# Patient Record
Sex: FEMALE | Race: WHITE | NOT HISPANIC OR LATINO | URBAN - METROPOLITAN AREA
[De-identification: names, ages, dates, MRNs, and addresses within clinical notes are randomized per-mention and may not be internally consistent; named-entity substitution may affect disease eponyms.]

---

## 2023-09-24 LAB
ANION GAP IN SER/PLAS: 11 MMOL/L (ref 10–20)
CALCIUM (MG/DL) IN SER/PLAS: 9.6 MG/DL (ref 8.6–10.6)
CARBON DIOXIDE, TOTAL (MMOL/L) IN SER/PLAS: 29 MMOL/L (ref 21–32)
CHLORIDE (MMOL/L) IN SER/PLAS: 106 MMOL/L (ref 98–107)
CREATININE (MG/DL) IN SER/PLAS: 0.8 MG/DL (ref 0.5–1.05)
ERYTHROCYTE DISTRIBUTION WIDTH (RATIO) BY AUTOMATED COUNT: 12.3 % (ref 11.5–14.5)
ERYTHROCYTE MEAN CORPUSCULAR HEMOGLOBIN CONCENTRATION (G/DL) BY AUTOMATED: 31.8 G/DL (ref 32–36)
ERYTHROCYTE MEAN CORPUSCULAR VOLUME (FL) BY AUTOMATED COUNT: 99 FL (ref 80–100)
ERYTHROCYTES (10*6/UL) IN BLOOD BY AUTOMATED COUNT: 4.18 X10E12/L (ref 4–5.2)
GFR FEMALE: 83 ML/MIN/1.73M2
GLUCOSE (MG/DL) IN SER/PLAS: 83 MG/DL (ref 74–99)
HEMATOCRIT (%) IN BLOOD BY AUTOMATED COUNT: 41.5 % (ref 36–46)
HEMOGLOBIN (G/DL) IN BLOOD: 13.2 G/DL (ref 12–16)
LEUKOCYTES (10*3/UL) IN BLOOD BY AUTOMATED COUNT: 4.9 X10E9/L (ref 4.4–11.3)
NRBC (PER 100 WBCS) BY AUTOMATED COUNT: 0 /100 WBC (ref 0–0)
PLATELETS (10*3/UL) IN BLOOD AUTOMATED COUNT: 210 X10E9/L (ref 150–450)
POTASSIUM (MMOL/L) IN SER/PLAS: 5.1 MMOL/L (ref 3.5–5.3)
SODIUM (MMOL/L) IN SER/PLAS: 141 MMOL/L (ref 136–145)
UREA NITROGEN (MG/DL) IN SER/PLAS: 15 MG/DL (ref 6–23)

## 2023-10-13 PROBLEM — Q21.12 PFO (PATENT FORAMEN OVALE) (HHS-HCC): Status: ACTIVE | Noted: 2023-10-13

## 2023-10-13 RX ORDER — SODIUM CHLORIDE 9 MG/ML
100 INJECTION, SOLUTION INTRAVENOUS CONTINUOUS
Status: CANCELLED | OUTPATIENT
Start: 2023-10-13

## 2023-10-17 ENCOUNTER — APPOINTMENT (OUTPATIENT)
Dept: CARDIOLOGY | Facility: HOSPITAL | Age: 63
End: 2023-10-17
Payer: COMMERCIAL

## 2023-10-17 ENCOUNTER — HOSPITAL ENCOUNTER (OUTPATIENT)
Facility: HOSPITAL | Age: 63
Setting detail: OUTPATIENT SURGERY
Discharge: HOME | End: 2023-10-17
Attending: INTERNAL MEDICINE | Admitting: INTERNAL MEDICINE
Payer: COMMERCIAL

## 2023-10-17 VITALS
BODY MASS INDEX: 18.65 KG/M2 | DIASTOLIC BLOOD PRESSURE: 50 MMHG | RESPIRATION RATE: 15 BRPM | HEIGHT: 60 IN | OXYGEN SATURATION: 100 % | SYSTOLIC BLOOD PRESSURE: 117 MMHG | HEART RATE: 56 BPM | WEIGHT: 95 LBS

## 2023-10-17 DIAGNOSIS — Q21.12 PFO (PATENT FORAMEN OVALE) (HHS-HCC): ICD-10-CM

## 2023-10-17 DIAGNOSIS — Q21.10 ATRIAL SEPTAL DEFECT (HHS-HCC): ICD-10-CM

## 2023-10-17 DIAGNOSIS — Z87.74 S/P PATENT FORAMEN OVALE CLOSURE: Primary | ICD-10-CM

## 2023-10-17 PROCEDURE — C1759 CATH, INTRA ECHOCARDIOGRAPHY: HCPCS | Performed by: INTERNAL MEDICINE

## 2023-10-17 PROCEDURE — 99152 MOD SED SAME PHYS/QHP 5/>YRS: CPT | Performed by: INTERNAL MEDICINE

## 2023-10-17 PROCEDURE — 93580 TRANSCATH CLOSURE OF ASD: CPT | Performed by: INTERNAL MEDICINE

## 2023-10-17 PROCEDURE — 2500000001 HC RX 250 WO HCPCS SELF ADMINISTERED DRUGS (ALT 637 FOR MEDICARE OP): Performed by: NURSE PRACTITIONER

## 2023-10-17 PROCEDURE — C1817 SEPTAL DEFECT IMP SYS: HCPCS | Performed by: INTERNAL MEDICINE

## 2023-10-17 PROCEDURE — 85347 COAGULATION TIME ACTIVATED: CPT | Performed by: INTERNAL MEDICINE

## 2023-10-17 PROCEDURE — 93662 INTRACARDIAC ECG (ICE): CPT | Performed by: INTERNAL MEDICINE

## 2023-10-17 PROCEDURE — C1769 GUIDE WIRE: HCPCS | Performed by: INTERNAL MEDICINE

## 2023-10-17 PROCEDURE — 99153 MOD SED SAME PHYS/QHP EA: CPT | Performed by: INTERNAL MEDICINE

## 2023-10-17 PROCEDURE — C1894 INTRO/SHEATH, NON-LASER: HCPCS | Performed by: INTERNAL MEDICINE

## 2023-10-17 PROCEDURE — 93010 ELECTROCARDIOGRAM REPORT: CPT | Performed by: INTERNAL MEDICINE

## 2023-10-17 PROCEDURE — 2780000003 HC OR 278 NO HCPCS: Performed by: INTERNAL MEDICINE

## 2023-10-17 PROCEDURE — 93308 TTE F-UP OR LMTD: CPT

## 2023-10-17 PROCEDURE — 2500000004 HC RX 250 GENERAL PHARMACY W/ HCPCS (ALT 636 FOR OP/ED): Performed by: NURSE PRACTITIONER

## 2023-10-17 PROCEDURE — 93308 TTE F-UP OR LMTD: CPT | Performed by: INTERNAL MEDICINE

## 2023-10-17 PROCEDURE — 2500000005 HC RX 250 GENERAL PHARMACY W/O HCPCS: Performed by: INTERNAL MEDICINE

## 2023-10-17 PROCEDURE — 93325 DOPPLER ECHO COLOR FLOW MAPG: CPT | Performed by: INTERNAL MEDICINE

## 2023-10-17 PROCEDURE — 2500000004 HC RX 250 GENERAL PHARMACY W/ HCPCS (ALT 636 FOR OP/ED): Performed by: INTERNAL MEDICINE

## 2023-10-17 PROCEDURE — 2720000007 HC OR 272 NO HCPCS: Performed by: INTERNAL MEDICINE

## 2023-10-17 PROCEDURE — 85347 COAGULATION TIME ACTIVATED: CPT | Mod: CMCLAB

## 2023-10-17 PROCEDURE — 2500000001 HC RX 250 WO HCPCS SELF ADMINISTERED DRUGS (ALT 637 FOR MEDICARE OP): Performed by: INTERNAL MEDICINE

## 2023-10-17 PROCEDURE — C1760 CLOSURE DEV, VASC: HCPCS | Performed by: INTERNAL MEDICINE

## 2023-10-17 DEVICE — PFO OCCLUDER
Type: IMPLANTABLE DEVICE | Site: HEART | Status: FUNCTIONAL
Brand: AMPLATZER™ TALISMAN™

## 2023-10-17 RX ORDER — LIDOCAINE HYDROCHLORIDE 20 MG/ML
INJECTION, SOLUTION INFILTRATION; PERINEURAL AS NEEDED
Status: DISCONTINUED | OUTPATIENT
Start: 2023-10-17 | End: 2023-10-17 | Stop reason: HOSPADM

## 2023-10-17 RX ORDER — ACETAMINOPHEN 160 MG/5ML
650 SOLUTION ORAL EVERY 6 HOURS PRN
Status: CANCELLED | OUTPATIENT
Start: 2023-10-17

## 2023-10-17 RX ORDER — PANTOPRAZOLE SODIUM 40 MG/10ML
40 INJECTION, POWDER, LYOPHILIZED, FOR SOLUTION INTRAVENOUS
Status: CANCELLED | OUTPATIENT
Start: 2023-10-18

## 2023-10-17 RX ORDER — DOCUSATE SODIUM 100 MG/1
100 CAPSULE, LIQUID FILLED ORAL 2 TIMES DAILY
Status: CANCELLED | OUTPATIENT
Start: 2023-10-17

## 2023-10-17 RX ORDER — ATORVASTATIN CALCIUM 80 MG/1
80 TABLET, FILM COATED ORAL DAILY
COMMUNITY
Start: 2023-07-07

## 2023-10-17 RX ORDER — ACETAMINOPHEN 325 MG/1
650 TABLET ORAL EVERY 6 HOURS PRN
Status: CANCELLED | OUTPATIENT
Start: 2023-10-17

## 2023-10-17 RX ORDER — PANTOPRAZOLE SODIUM 40 MG/1
40 TABLET, DELAYED RELEASE ORAL
Status: CANCELLED | OUTPATIENT
Start: 2023-10-18

## 2023-10-17 RX ORDER — CLOPIDOGREL BISULFATE 75 MG/1
75 TABLET ORAL DAILY
Qty: 90 TABLET | Refills: 2 | Status: SHIPPED | OUTPATIENT
Start: 2023-10-17

## 2023-10-17 RX ORDER — ACETAMINOPHEN 650 MG/1
650 SUPPOSITORY RECTAL EVERY 6 HOURS PRN
Status: CANCELLED | OUTPATIENT
Start: 2023-10-17

## 2023-10-17 RX ORDER — NAPROXEN SODIUM 220 MG/1
81 TABLET, FILM COATED ORAL DAILY
Qty: 90 TABLET | Refills: 11 | Status: SHIPPED | OUTPATIENT
Start: 2023-10-17

## 2023-10-17 RX ORDER — TRAMADOL HYDROCHLORIDE 50 MG/1
50 TABLET ORAL EVERY 6 HOURS PRN
Status: CANCELLED | OUTPATIENT
Start: 2023-10-17

## 2023-10-17 RX ORDER — ONDANSETRON HYDROCHLORIDE 2 MG/ML
4 INJECTION, SOLUTION INTRAVENOUS EVERY 8 HOURS PRN
Status: CANCELLED | OUTPATIENT
Start: 2023-10-17

## 2023-10-17 RX ORDER — MIDAZOLAM HYDROCHLORIDE 1 MG/ML
INJECTION INTRAMUSCULAR; INTRAVENOUS AS NEEDED
Status: DISCONTINUED | OUTPATIENT
Start: 2023-10-17 | End: 2023-10-17 | Stop reason: HOSPADM

## 2023-10-17 RX ORDER — HEPARIN SODIUM 1000 [USP'U]/ML
INJECTION, SOLUTION INTRAVENOUS; SUBCUTANEOUS AS NEEDED
Status: DISCONTINUED | OUTPATIENT
Start: 2023-10-17 | End: 2023-10-17 | Stop reason: HOSPADM

## 2023-10-17 RX ORDER — CLOPIDOGREL BISULFATE 75 MG/1
75 TABLET ORAL DAILY
Status: DISCONTINUED | OUTPATIENT
Start: 2023-10-18 | End: 2023-10-17 | Stop reason: HOSPADM

## 2023-10-17 RX ORDER — SODIUM CHLORIDE, SODIUM LACTATE, POTASSIUM CHLORIDE, CALCIUM CHLORIDE 600; 310; 30; 20 MG/100ML; MG/100ML; MG/100ML; MG/100ML
75 INJECTION, SOLUTION INTRAVENOUS CONTINUOUS
Status: DISCONTINUED | OUTPATIENT
Start: 2023-10-17 | End: 2023-10-17 | Stop reason: HOSPADM

## 2023-10-17 RX ORDER — LEVOTHYROXINE SODIUM 75 UG/1
75 TABLET ORAL
COMMUNITY
Start: 2013-12-03

## 2023-10-17 RX ORDER — VANCOMYCIN HYDROCHLORIDE 1 G/200ML
1000 INJECTION, SOLUTION INTRAVENOUS ONCE
Status: COMPLETED | OUTPATIENT
Start: 2023-10-17 | End: 2023-10-17

## 2023-10-17 RX ORDER — ESTRADIOL 10 UG/1
10 INSERT VAGINAL 2 TIMES WEEKLY
COMMUNITY
Start: 2017-12-08

## 2023-10-17 RX ORDER — CLOPIDOGREL BISULFATE 300 MG/1
TABLET, FILM COATED ORAL AS NEEDED
Status: DISCONTINUED | OUTPATIENT
Start: 2023-10-17 | End: 2023-10-17 | Stop reason: HOSPADM

## 2023-10-17 RX ORDER — ASPIRIN 81 MG/1
81 TABLET ORAL DAILY
Status: CANCELLED | OUTPATIENT
Start: 2023-10-18

## 2023-10-17 RX ORDER — FENTANYL CITRATE 50 UG/ML
INJECTION, SOLUTION INTRAMUSCULAR; INTRAVENOUS AS NEEDED
Status: DISCONTINUED | OUTPATIENT
Start: 2023-10-17 | End: 2023-10-17 | Stop reason: HOSPADM

## 2023-10-17 RX ORDER — NAPROXEN SODIUM 220 MG/1
324 TABLET, FILM COATED ORAL ONCE
Status: COMPLETED | OUTPATIENT
Start: 2023-10-17 | End: 2023-10-17

## 2023-10-17 RX ORDER — ONDANSETRON 4 MG/1
4 TABLET, ORALLY DISINTEGRATING ORAL EVERY 8 HOURS PRN
Status: CANCELLED | OUTPATIENT
Start: 2023-10-17

## 2023-10-17 RX ADMIN — ASPIRIN 81 MG CHEWABLE TABLET 324 MG: 81 TABLET CHEWABLE at 07:00

## 2023-10-17 NOTE — H&P
Ms. Castelan is a 63-year-old female with past medical history of hypothyroidism and TIAs.   Her first TIA dates back to 2012 which involved dizziness and headache at this time MRIs were negative and she was started on Plavix. Her next episode of TIA was in 2016 which involved right hemiparesis that lasted few minutes. She had another recent episode in April 2023 which involved left hemiparesis for 30 minutes. Patient says she has had CT in the past which showed fetal PTCA. As per the patient, her last MRI shows recent strokes which are new compared to 2016. She is currently on Eliquis and atorvastatin.    JANNA shows normal ejection fraction with aneurysmal intra-atrial septum and a PFO, bubble study was positive  Transthoracic echo shows ejection fraction of 65%, aneurysmal interatrial septum with fenestration and right-to-left shunting, bubble study was strongly positive.     Given PFO noted on echo, and without other obvious etiology for his neurologic event, the patient is here for  PFO closure after consultation with stroke neurology.       Active Problems  Problems    · Breast calcification, right (793.89) (R92.1)   · Encounter for colorectal cancer screening (V76.51,V76.41) (Z12.11,Z12.12)   · Encounter for screening mammogram for breast cancer (V76.12) (Z12.31)   · Former smoker (V15.82) (Z87.891)   · [id number]   · Interatrial cardiac shunt (746.89) (Q24.8)   · Interatrial septal aneurysm with PFO (745.5) (Q21.12,I25.3)   · Right ankle pain (719.47) (M25.571)   · TIA (transient ischemic attack) (435.9) (G45.9)   · Vaginal dryness (625.8) (N89.8)   · Well woman exam with routine gynecological exam (V72.31) (Z01.419)    Surgical History  Problems    · History of Abdominal surgery   · History of Complete colonoscopy   · History of Exploratory laparotomy    Past Medical History  Problems    · History of Facial droop due to stroke   · History of adenomatous polyp of colon (V12.72) (Z86.010)   · History of  hypothyroidism (V12.29) (Z86.39)   · History of transient cerebral ischemia (V12.54) (Z86.73)   · Personal history of atrial septal defect (V13.65) (Z87.74)   · Personal history of female infertility (V13.29) (Z87.42)    Current Meds    Medication Name Instruction   Atorvastatin Calcium 80 MG Oral Tablet TAKE 1 TABLET DAILY.   Eliquis 5 MG Oral Tablet Take 1 tablet twice daily   Synthroid 75 MCG Oral Tablet TAKE 1 TABLET PO EVERY DAY   Xiidra 5 % Ophthalmic Solution USE AS DIRECTED   Yuvafem 10 MCG Vaginal Tablet INSERT TABLET VAGINALLY TWICE A WEEK     Allergies  Medication    · Augmentin TABS  Hives;; Recorded By: Nanette Canada; 4/28/2014 1:33:40 PM    Family History  Mother    · Family history of malignant neoplasm of skin (V16.8) (Z80.8)  Maternal Grandfather    · Family history of malignant neoplasm of skin (V16.8) (Z80.8)  Other    · Family history of systemic lupus erythematosus (V19.4) (Z82.69)    Social History  Problems    · Always uses seat belt   · Caffeine use (V49.89) (Z78.9)   ·    · Former smoker (V15.82) (Z87.891)   · 01JAN1992   · Full-time employment   · Denied: History of domestic violence   · Never a smoker   · No alcohol use   · No drug use   · Social alcohol use    Review of Systems  Constitutional: not feeling tired, not feeling poorly, no fever and no chills.   Eyes: no eyesight problems, no blurred vision, no diplopia, no eye pain, no purulent discharge from the eyes, eyes not red, no dryness of the eyes and no itching of the eyes.   ENT: no nosebleeds, no hearing loss, no tinnitus, no earache, no sore throat, no hoarseness, no swollen glands in the neck and no nasal discharge.   Cardiovascular: no intermittent leg claudication, no chest pain, no tightness or heavy pressure, no shortness of breath, no palpitations, no lower extremity edema, the heart rate was not slow, the heart rate was not fast and as noted in HPI.   Respiratory: no chronic cough, no shortness of breath, not  coughing up sputum, no shortness of breath during exertion, no wheezing that is consistent with asthma, no asthma, no orthopnea and no postural nocturnal dyspnea.   Gastrointestinal: no change in bowel habits, no blood in stools, no diarrhea, no constipation, no nausea, no vomiting, no abdominal pain, no signs and symptoms of ulcer disease, no jordan colored stools and no intolerance to fatty foods.   Genitourinary: no hematuria, no urinary frequency, no dysuria, no incontinence, no burning sensation during urination, no urinary hesitancy, no nocturia, no genital lesion, no testicular pain, urinary stream is not smaller and urinary stream does not start and stop.   Musculoskeletal: no arthralgias, no myalgias, no joint swelling, no joint stiffness, no muscle weakness, no back pain, no limb pain, no limb swelling and no difficulty walking.   Skin: no skin rashes, no change in skin color and pigmentation, no skin lesions and no skin lumps.   Neurological: no seizures, no frequent falls, no headaches, no dizziness, no tingling, no fainting and no limb weakness.   Psychiatric: no depression, not suicidal, no confusion, no memory lapses or loss, no anxiety, no personality change and no emotional problems.   Endocrine: no goiter, no thyroid disorder, no diabetes mellitus, no excessive thirst, no dry skin, no cold intolerance, no heat intolerance, , no increased urinary frequency, no proptosis and no deepening of the voice.   Hematologic/Lymphatic: no bleeding issues.   All other systems have been reviewed and are negative for complaint.        Vitals  Vital Signs   reviewed  Physical Exam  Constitutional: alert and in no acute distress.   Eyes: no erythema, swelling or discharge from the eye .   Ears, Nose, Mouth, and Throat: external inspection of ears and nose is normal , lips, teeth, and gums are normal with good dentition and oropharynx normal with no erythema, edema, exudate or lesions .   Neck: neck is supple,  symmetric, trachea midline, no masses and no thyromegaly .   Pulmonary: no increased work of breathing or signs of respiratory distress , lungs clear to auscultation. , normal percussion of chest and chest palpation normal .   Cardiovascular: carotid pulses 2+ bilaterally with no bruit , JVP was normal, no thrills , regular rhythm, normal S1 and S2, no murmurs , no pitting edema  Abdomen: abdomen non-tender, no masses and no hepatomegaly .   Skin: . no skin lesions   Neurologic: non-focal neurologic examination.   Psychiatric judgment and insight is normal , oriented to person, place and time , normal mood and affect        Results/Data  Echocardiographic images were reviewed which showed normal ejection fraction, normal left atrial and right atrial size, normal RV size and function, a large PFO and positive bubble study.      Impressions    This is a 63 year-old male with cryptogenic TIAs referred for PFO closure.     Given her history of multiple cryptogenic TIAs in the presence of large PFO on echocardiographic images, she is reasonable candidate for PFO closure    We placed an event monitor for 4 weeks which  rule out A-fib.     Proceed with PFO closure    Following device implant, strategy will be for dual antiplatelet therapy with aspirin and clopidogrel for 6 months then aspirin for life. Any dental work will need prophylactic antibiotics.

## 2023-10-17 NOTE — PROGRESS NOTES
Pharmacy Medication History Review    Heena Castelan is a 63 y.o. female admitted for PFO (patent foramen ovale). Pharmacy reviewed the patient's [id number] medications and allergies for accuracy.    The list below reflects the updated PTA list. Please review each medication in order reconciliation for additional clarification and justification.  Medications Prior to Admission   Medication Sig Dispense Refill Last Dose    apixaban (Eliquis) 5 mg tablet Take 1 tablet (5 mg) by mouth every 12 hours.   10/13/2023    atorvastatin (Lipitor) 80 mg tablet Take 1 tablet (80 mg) by mouth once daily.   10/16/2023    estradiol (Vagifem) 10 mcg tablet vaginal tablet Insert 1 tablet (10 mcg) into the vagina 2 times a week. On Monday and Thursday   10/12/2023    levothyroxine (Synthroid) 75 mcg tablet Take 1 tablet (75 mcg) by mouth once daily in the morning. Take before meals.   10/16/2023        The list below reflects the updated allergy list. Please review each documented allergy for additional clarification and justification.  Allergies  Reviewed by Soraida Riojas PharmD on 10/17/2023        Severity Reactions Comments    Amoxicillin-pot Clavulanate Not Specified Hives             Below are additional concerns with the patient's PTA list.  Patient was excellent historian of medications/ Pharmacy - [facility], [facility]/ Chart review    Soraida Riojas PharmD  Transitions of Care Pharmacist  [facility]  Please reach out via secure chat for questions, or if no response call [facility] or [facility]

## 2023-10-17 NOTE — PRE-SEDATION DOCUMENTATION
Sedation Plan    ASA 3     Mallampati class: III.    Risks, benefits, and alternatives discussed with patient.

## 2023-10-17 NOTE — POST-PROCEDURE NOTE
Physician Transition of Care Summary  Invasive Cardiovascular Lab    Procedure Date: 10/17/2023  Attending:    * Devendra Kaufman - Primary  Resident/Fellow/Other Assistant: No surgical staff documented.    Pre Procedure Indications:   PFO associated with Cryptogenic stroke      Procedure(s):   PFO closure      Description of the Procedure:   Successful PFO closure with a 25 mm Amplatzer device.    Access: Right CFV x 2: Closure with Vascades x 2    Complications:   None        Estimated Blood Loss:   10 mL    Anesthesia: Moderate Sedation Anesthesia Staff: No anesthesia staff entered.    Any Specimen(s) Removed:   No specimens collected during this procedure.    Disposition:   Patient will be discharged home on aspirin and Plavix after postop echo is reviewed.      Electronically signed by: Tennille Knight MD, 10/17/2023 10:26 AM

## 2023-10-17 NOTE — DISCHARGE INSTRUCTIONS
PFO Discharge Instructions  Anticoagulation Plan:  You are being discharged on Aspirin and Plavix for 6 months (Plavix will be stopped after 6 months and you will remain on 81mg Aspirin for life).  You will have a 1 month follow-up with Dr. Kaufman and an ECHO, which will be scheduled for you.     General Instructions:  DO NOT drink any alcoholic drinks or take any non-prescriptive medications that contain alcohol for the first 24 hours.   DO NOT make any important decisions for the first 24 hours.  Activity:  You are advised to go directly home from the hospital.   DO NOT lift anything heavier than 10 pounds for one week, this allows for proper healing of the groin.   No excessive exercise or treadmill use for one week. You may walk and do stairs, slowly.   No sexual activities for 24 hours after you arrive home.    Wound Care:  If slight bleeding should occur at site, lie down and have someone apply firm pressure just above the puncture site for 5 minutes.  If it continues or is profuse, call 911. Always notify your doctor if bleeding occurs.   Keep site clean and dry. Let air dry or you may use a simple bandaid.   Gently cleanse the puncture site in your groin with soap and water only.   You may experience some tenderness, bruising or minimal inflammation.  If you have any concerns, you may contact the Cath Lab or if any of these symptoms become excessive, contact your cardiologist or go to the emergency room.   No tub baths, soaking, or swimming for one week.   May shower the next day after your procedure.    Diet:  You may resume your normal diet. However it is better to start with liquids such as juices then soup and crackers, and gradually work up to solid foods.    Other Instructions:  If you develop difficulty breathing, rash, hives, severe nausea, vomiting, light-headedness or any signs of infection, immediately contact your doctor and go to the nearest emergency room.   You must take your aspirin,  clopidogrel (Plavix), prasugrel (Effient), or ticagrelor (Brilinta) every day without missing a single dose.  If you are getting low on these medications, contact your physician immediately for a refill.  - CALL 911 IF YOU HAVE ANY OF THE SIGNS AND SYMPTOMS OF HEART FAILURE: 1. Chest pain 2. Significant Shortness of breath 3. Fainting.     Call Provider If (Home-going Patients):  Breathing faster than normal.   Breathing harder than normal or having retractions.   Fever of 100.4 F (38 C) or higher.   Chills.   Drinking less than normal.   Urinating less than normal, over 1 day.   Acting very sleepy and difficult to awaken.   Vomiting (throwing up) and not able to eat or drink for 12 hours.   3 or more loose, watery bowel movements in 24 hours (diarrhea).   Any new concerning symptoms.

## 2023-10-17 NOTE — DISCHARGE SUMMARY
Discharge Diagnosis  PFO (patent foramen ovale) s/p closure with a 25 mm Amplatzer Device    Issues Requiring Follow-Up  The patient will have 1, 6 and 12 month TTEs as per protocol    Test Results Pending At Discharge  Pending Labs       No current pending labs.            Hospital Course   63-year-old lady with cryptogenic strokes in the setting of PFO.  She came for elective PFO closure.  The procedure went well without any complication.     The patient will be monitored for a few hours.  Postop echo will be performed.  If access sites and the echo looks unremarkable patient will be discharged home later today.    Home Medications     Medication List      START taking these medications     aspirin 81 mg chewable tablet; Chew 1 tablet (81 mg) once daily.   clopidogrel 75 mg tablet; Commonly known as: Plavix; Take 1 tablet (75   mg) by mouth once daily.     CONTINUE taking these medications     atorvastatin 80 mg tablet; Commonly known as: Lipitor   estradiol 10 mcg tablet vaginal tablet; Commonly known as: Vagifem   Synthroid 75 mcg tablet; Generic drug: levothyroxine     STOP taking these medications     Eliquis 5 mg tablet; Generic drug: apixaban       Outpatient Follow-Up  Future Appointments   Date Time Provider Department Center   11/16/2023 10:30 AM [facility] ECHO 1 [id number] [facility]   11/16/2023 11:30 AM Devendra Kaufman MD [id number] [facility]   1/15/2024  8:15 AM BIJAL [facility]-CNP [id number]   3/21/2024 10:30 AM [facility] ECHO 1 [id number] [facility]   3/21/2024 11:30 AM Devendra Kaufman MD [id number] [facility]   10/10/2024 10:30 AM [facility] ECHO 1 [id number] [facility]   10/10/2024 11:30 AM Devendra Kaufman MD [id number] [facility]       Tennille Knight MD

## 2023-10-17 NOTE — Clinical Note
Sheath was exchanged with SHEATH, INTRODUCER, 10CM, 8FR, R/O RADIOPAQUE MARKER, 8FR RAYO, 2.5 CM DILATOR..

## 2023-10-20 LAB — ACT BLD: 268 SEC (ref 89–169)

## 2023-10-24 ENCOUNTER — HOSPITAL ENCOUNTER (OUTPATIENT)
Dept: CARDIOLOGY | Facility: HOSPITAL | Age: 63
Discharge: HOME | End: 2023-10-24
Payer: COMMERCIAL

## 2023-10-24 LAB
ATRIAL RATE: 57 BPM
P AXIS: 54 DEGREES
P OFFSET: 205 MS
P ONSET: 159 MS
PR INTERVAL: 128 MS
Q ONSET: 223 MS
QRS COUNT: 9 BEATS
QRS DURATION: 80 MS
QT INTERVAL: 406 MS
QTC CALCULATION(BAZETT): 395 MS
QTC FREDERICIA: 399 MS
R AXIS: 57 DEGREES
T AXIS: 44 DEGREES
T OFFSET: 426 MS
VENTRICULAR RATE: 57 BPM

## 2023-10-24 PROCEDURE — 93005 ELECTROCARDIOGRAM TRACING: CPT

## 2023-11-13 ENCOUNTER — TELEPHONE (OUTPATIENT)
Dept: CARDIOLOGY | Facility: HOSPITAL | Age: 63
End: 2023-11-13
Payer: COMMERCIAL

## 2023-11-14 ENCOUNTER — TELEPHONE (OUTPATIENT)
Dept: CARDIOLOGY | Facility: HOSPITAL | Age: 63
End: 2023-11-14
Payer: COMMERCIAL

## 2023-12-28 ENCOUNTER — APPOINTMENT (OUTPATIENT)
Dept: CARDIOLOGY | Facility: HOSPITAL | Age: 63
End: 2023-12-28
Payer: COMMERCIAL

## 2023-12-28 ENCOUNTER — HOSPITAL ENCOUNTER (OUTPATIENT)
Dept: CARDIOLOGY | Facility: HOSPITAL | Age: 63
Discharge: HOME | End: 2023-12-28
Payer: COMMERCIAL

## 2023-12-28 DIAGNOSIS — I25.3 INTERATRIAL SEPTAL ANEURYSM WITH PFO (HHS-HCC): ICD-10-CM

## 2023-12-28 DIAGNOSIS — Q21.12 INTERATRIAL SEPTAL ANEURYSM WITH PFO (HHS-HCC): ICD-10-CM

## 2023-12-28 LAB
EJECTION FRACTION APICAL 4 CHAMBER: 69
EJECTION FRACTION: 70
LEFT VENTRICLE INTERNAL DIMENSION DIASTOLE: 4.2 (ref 3.5–6)
RIGHT VENTRICLE PEAK SYSTOLIC PRESSURE: 20.1
TRICUSPID ANNULAR PLANE SYSTOLIC EXCURSION: 2.2

## 2023-12-28 PROCEDURE — 93325 DOPPLER ECHO COLOR FLOW MAPG: CPT

## 2023-12-28 PROCEDURE — 93308 TTE F-UP OR LMTD: CPT | Performed by: INTERNAL MEDICINE

## 2023-12-28 PROCEDURE — 93321 DOPPLER ECHO F-UP/LMTD STD: CPT | Performed by: INTERNAL MEDICINE

## 2023-12-28 PROCEDURE — 93325 DOPPLER ECHO COLOR FLOW MAPG: CPT | Performed by: INTERNAL MEDICINE

## 2024-01-03 ENCOUNTER — TELEMEDICINE (OUTPATIENT)
Dept: CARDIOLOGY | Facility: CLINIC | Age: 64
End: 2024-01-03
Payer: COMMERCIAL

## 2024-01-03 DIAGNOSIS — Z87.74 S/P PATENT FORAMEN OVALE CLOSURE: Primary | ICD-10-CM

## 2024-01-03 DIAGNOSIS — Q21.12 PFO (PATENT FORAMEN OVALE) (HHS-HCC): ICD-10-CM

## 2024-01-03 PROCEDURE — 99213 OFFICE O/P EST LOW 20 MIN: CPT | Performed by: INTERNAL MEDICINE

## 2024-01-04 NOTE — PROGRESS NOTES
Follow up- Post PFO closure     For the purposes of chart review, Ms. Castelan is a 63-year-old female with past medical history of hypothyroidism and TIAs.   Her first TIA dates back to 2012 which involved dizziness and headache at this time MRIs were negative and she was started on Plavix. Her next episode of TIA was in 2016 which involved right hemiparesis that lasted few minutes. She had another recent episode in April 2023 which involved left hemiparesis for 30 minutes. Patient says she has had CT in the past which showed fetal PTCA. As per the patient, her last MRI shows recent strokes which are new compared to 2016. She is currently on Eliquis and atorvastatin.     JANNA shows normal ejection fraction with aneurysmal intra-atrial septum and a PFO, bubble study was positive    Transthoracic echo shows ejection fraction of 65%, aneurysmal interatrial septum with fenestration and right-to-left shunting, bubble study was strongly positive.              Patient is now s/p PFO closure on October 17, 2023 with a 25 mm Amplatz PFO occluder device       On my interview today patient is doing well denies any chest pain shortness of breath or any further neurological events.       She continues to take aspirin and Plavix       Virtual visit- Total time   face-to-face time      ROS:  Constitutional: not feeling tired, not feeling poorly, no fever and no chills.   Eyes: no eyesight problems, no blurred vision, no diplopia, no eye pain, no purulent discharge from the eyes, eyes not red, no dryness of the eyes and no itching of the eyes.   ENT: no nosebleeds, no hearing loss, no tinnitus, no earache, no sore throat, no hoarseness, no swollen glands in the neck and no nasal discharge.   Cardiovascular: no intermittent leg claudication, no chest pain, no tightness or heavy pressure, no shortness of breath, no palpitations, no lower extremity edema, the heart rate was not slow, the heart rate was not fast and as noted in HPI.    Respiratory: no chronic cough, not coughing up sputum,  no wheezing that is consistent with asthma, no asthma, no orthopnea and no postural nocturnal dyspnea.   Gastrointestinal: no change in bowel habits, no blood in stools, no diarrhea, no constipation, no nausea, no vomiting, no abdominal pain, no signs and symptoms of ulcer disease, no jordan colored stools and no intolerance to fatty foods.   Genitourinary: no hematuria,  no urinary frequency, no dysuria, no incontinence, no burning sensation during urination, no urinary hesitancy, no nocturia, no genital lesion, no testicular pain, urinary stream is not smaller and urinary stream does not start and stop.   Musculoskeletal: no arthralgias, no myalgias, no joint swelling, no joint stiffness, no muscle weakness, no back pain, no limb pain, no limb swelling and no difficulty walking.   Skin: no skin rashes, no change in skin color and pigmentation, no skin lesions and no skin lumps.   Neurological: no seizures, no frequent falls, no headaches, no dizziness, no tingling, no fainting and no limb weakness.   Psychiatric: no depression, not suicidal, no confusion, no memory lapses or loss, no anxiety, no personality change and no emotional problems.   Endocrine: no goiter, no thyroid disorder, no diabetes mellitus, no excessive thirst, no dry skin, no cold intolerance, no heat intolerance, no erectile dysfunction, no increased urinary frequency, no proptosis and no deepening of the voice.   Hematologic/Lymphatic: no bleeding issues.   All other systems have been reviewed and are negative for complaint.     Physical Exam:     Visit Vitals  Smoking Status Never Assessed             Constitutional: Awake alert, looks good      Labs:    Results for orders placed or performed during the hospital encounter of 12/28/23   Transthoracic Echo (TTE) Limited   Result Value Ref Range    LV biplane EF 70     Tricuspid annular plane systolic excursion 2.2     LVIDd 4.20     RVSP  20.1     LV A4C EF 69.0           Medications:    Current Outpatient Medications   Medication Instructions    aspirin 81 mg, oral, Daily    atorvastatin (LIPITOR) 80 mg, oral, Daily    clopidogrel (PLAVIX) 75 mg, oral, Daily    estradiol (VAGIFEM) 10 mcg, vaginal, 2 times weekly, On Monday and Thursday    levothyroxine (SYNTHROID) 75 mcg, oral, Daily before breakfast          Assessment:      This is a 63-year-old female with history of TIA and stroke, who is now status post PFO closure with 25 mm Amplatz PFO occluder device.  Patient is doing well with no further neurological events.  Denies any issues with groin access site    -We reviewed the images of echocardiogram done on December 26, 2023 which demonstrates well-seated PFO occluder device with no leak around the device and a negative bubble study    -Patient will continue aspirin and Plavix for 6 months postprocedure, after which she will stop the Plavix and continue to take aspirin 81 mg daily    -Any dental work in the first 6 months postprocedure will require antibiotic prophylaxis.     - We will repeat an echocardiogram  6 months postprocedure and follow-up at that time

## 2024-01-09 ENCOUNTER — APPOINTMENT (OUTPATIENT)
Dept: DERMATOLOGY | Facility: CLINIC | Age: 64
End: 2024-01-09
Payer: COMMERCIAL

## 2024-01-15 ENCOUNTER — OFFICE VISIT (OUTPATIENT)
Dept: DERMATOLOGY | Facility: CLINIC | Age: 64
End: 2024-01-15
Payer: COMMERCIAL

## 2024-01-15 DIAGNOSIS — L81.4 LENTIGO: ICD-10-CM

## 2024-01-15 DIAGNOSIS — Z12.83 SKIN CANCER SCREENING: Primary | ICD-10-CM

## 2024-01-15 DIAGNOSIS — D22.9 NEVUS: ICD-10-CM

## 2024-01-15 DIAGNOSIS — D18.01 CHERRY ANGIOMA: ICD-10-CM

## 2024-01-15 PROCEDURE — 99213 OFFICE O/P EST LOW 20 MIN: CPT | Performed by: NURSE PRACTITIONER

## 2024-01-15 NOTE — PROGRESS NOTES
Subjective     Heena Castelan is a 63 y.o. female who presents for the following: Skin Check.     Established patient in for annual full body skin exam. Last seen 2021.    Review of Systems:  No other skin or systemic complaints other than what is documented elsewhere in the note.    The following portions of the chart were reviewed this encounter and updated as appropriate:       Skin Cancer History  Hx of BCC 2015- right canthus. Right collarbone    Specialty Problems    None    Past Medical History:  Heena Castelan  has a past medical history of Disease of thyroid gland, Other conditions influencing health status, Personal history of colonic polyps (04/22/2020), Personal history of other diseases of the circulatory system, Personal history of other diseases of the female genital tract, Personal history of other endocrine, nutritional and metabolic disease, Personal history of transient ischemic attack (TIA), and cerebral infarction without residual deficits, and Stroke (CMS/HCC).    Past Surgical History:  Heena Castelan  has a past surgical history that includes Other surgical history (06/10/2022); Other surgical history (06/10/2022); Other surgical history (06/10/2022); and Cardiac catheterization (N/A, 10/17/2023).    Family History:  Patient family history is not on file.    Social History:  Heena Castelan  has no history on file for tobacco use, alcohol use, and drug use.    Allergies:  Amoxicillin-pot clavulanate    Current Medications / CAM's:    Current Outpatient Medications:     aspirin 81 mg chewable tablet, Chew 1 tablet (81 mg) once daily., Disp: 90 tablet, Rfl: 11    atorvastatin (Lipitor) 80 mg tablet, Take 1 tablet (80 mg) by mouth once daily., Disp: , Rfl:     clopidogrel (Plavix) 75 mg tablet, Take 1 tablet (75 mg) by mouth once daily., Disp: 90 tablet, Rfl: 2    estradiol (Vagifem) 10 mcg tablet vaginal tablet, Insert 1 tablet (10 mcg) into the vagina 2 times a week. On Monday and Thursday,  Disp: , Rfl:     levothyroxine (Synthroid) 75 mcg tablet, Take 1 tablet (75 mcg) by mouth once daily in the morning. Take before meals., Disp: , Rfl:      Objective   Well appearing patient in no apparent distress; mood and affect are within normal limits.    A full examination was performed including scalp, head, eyes, ears, nose, lips, neck, chest, axillae, abdomen, back, buttocks, bilateral upper extremities, bilateral lower extremities, hands, feet, fingers, toes, fingernails, and toenails. All findings within normal limits unless otherwise noted below.    Assessment/Plan   1. 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     The patient presented for a routine skin examination today. There are no specific concerns regarding skin health and no new or changing moles, lesions, or rashes.     Assessment: Based on the comprehensive skin examination, there were no concerning or abnormal findings. The patient's skin appeared to be in good health, without any notable dermatologic conditions or lesions.    Plan: Given the absence of any significant skin findings, no specific interventions or treatments are warranted at this time. The patient was educated on the importance of regular skin self-examinations and advised to promptly report any changes or concerns. Routine follow-up for a skin examination was recommended.    -These lesions have benign, reassuring patterns on dermoscopy.  -There were no concerning features found on exam today.  -Recommend continued self-observation, and to contact the office if any changes in nevi are  noticed.    Discussed/information given on safe sun practices and use of sunscreen, sun protective clothing or sun avoidance. Recommend to use OTC medication of sunscreen SPF 30 or higher on a daily basis prior to sun exposure to reduce the risk of skin cancer.    Contact Office if: Any lesions change in size, shape or color; itch, bum or bleed.         2.  Nevus  Multiple benign appearing flesh colored to pigmented macules and papules     Plan: Counseling.  I counseled the patient regarding the following:  Instructions: Monthly self-skin checks to monitor for any changes in moles are recommended. Expectations: Benign Nevi are pigmented nests of cells within the skin.No treatment is necessary. Contact Office if: Any moles change in size, shape or color; itch, bum or bleed.    3. Lentigo  Scattered tan macules in sun-exposed areas.    Solar lentigo (a type of lentigo also known as a senile lentigo, age spot, or liver spot) is a benign pigmented macule appearing on fair-skinned individuals that is related to ultraviolet radiation (UVR) exposure, typically from the sun.     PLAN:  Limiting sun exposure through avoidance, protective clothing, and use of sunscreens can help prevent the appearance of solar lentigines.    If lesion changes or becomes symptomatic she should return to clinic    4. Escobar angioma  Small (2-10 mm), bright red or violaceous macules or smooth, domed papules    PLAN:  Reassurance these lesions are benign  Treatment is only indicated in the case of irritation or hemorrhage

## 2024-03-21 ENCOUNTER — OFFICE VISIT (OUTPATIENT)
Dept: CARDIOLOGY | Facility: HOSPITAL | Age: 64
End: 2024-03-21
Payer: COMMERCIAL

## 2024-03-21 ENCOUNTER — HOSPITAL ENCOUNTER (OUTPATIENT)
Dept: CARDIOLOGY | Facility: HOSPITAL | Age: 64
Discharge: HOME | End: 2024-03-21
Payer: COMMERCIAL

## 2024-03-21 VITALS
BODY MASS INDEX: 18.65 KG/M2 | HEIGHT: 60 IN | HEART RATE: 54 BPM | SYSTOLIC BLOOD PRESSURE: 127 MMHG | OXYGEN SATURATION: 99 % | WEIGHT: 95 LBS | DIASTOLIC BLOOD PRESSURE: 67 MMHG

## 2024-03-21 DIAGNOSIS — I25.3 INTERATRIAL SEPTAL ANEURYSM WITH PFO (HHS-HCC): ICD-10-CM

## 2024-03-21 DIAGNOSIS — Q21.12 INTERATRIAL SEPTAL ANEURYSM WITH PFO (HHS-HCC): ICD-10-CM

## 2024-03-21 DIAGNOSIS — Z87.74 S/P PATENT FORAMEN OVALE CLOSURE: Primary | ICD-10-CM

## 2024-03-21 PROCEDURE — 99214 OFFICE O/P EST MOD 30 MIN: CPT | Performed by: INTERNAL MEDICINE

## 2024-03-21 PROCEDURE — 93306 TTE W/DOPPLER COMPLETE: CPT

## 2024-03-21 PROCEDURE — 93306 TTE W/DOPPLER COMPLETE: CPT | Performed by: STUDENT IN AN ORGANIZED HEALTH CARE EDUCATION/TRAINING PROGRAM

## 2024-03-21 ASSESSMENT — PAIN SCALES - GENERAL: PAINLEVEL: 0-NO PAIN

## 2024-03-22 LAB
AORTIC VALVE PEAK VELOCITY: 1.65 M/S
AV PEAK GRADIENT: 10.9 MMHG
AVA (PEAK VEL): 2.29 CM2
EJECTION FRACTION APICAL 4 CHAMBER: 65.6
EJECTION FRACTION: 69 %
LEFT ATRIUM VOLUME AREA LENGTH INDEX BSA: 24.9 ML/M2
LEFT VENTRICLE INTERNAL DIMENSION DIASTOLE: 4.04 CM (ref 3.5–6)
LEFT VENTRICULAR OUTFLOW TRACT DIAMETER: 1.79 CM
MITRAL VALVE E/A RATIO: 1.47
MITRAL VALVE E/E' RATIO: 9.22
RIGHT VENTRICLE FREE WALL PEAK S': 14 CM/S
RIGHT VENTRICLE PEAK SYSTOLIC PRESSURE: 29.5 MMHG
TRICUSPID ANNULAR PLANE SYSTOLIC EXCURSION: 2.1 CM

## 2024-03-22 NOTE — PROGRESS NOTES
Patient is returning 6 months after PFO Closure    For the purposes of chart review, Ms. Castelan is a 63-year-old female with past medical history of hypothyroidism and TIAs.   Her first TIA dates back to 2012 which involved dizziness and headache at this time MRIs were negative and she was started on Plavix. Her next episode of TIA was in 2016 which involved right hemiparesis that lasted few minutes. She had another recent episode in April 2023 which involved left hemiparesis for 30 minutes. Patient says she has had CT in the past which showed fetal PTCA. As per the patient, her last MRI shows recent strokes which are new compared to 2016. She is currently on Eliquis and atorvastatin.     JANNA shows normal ejection fraction with aneurysmal intra-atrial septum and a PFO, bubble study was positive     Initial transthoracic echo showed ejection fraction of 65%, aneurysmal interatrial septum with fenestration and right-to-left shunting, bubble study was strongly positive.              Patient is now s/p PFO closure on October 17, 2023 with a 25 mm Amplatz PFO occluder device        On my interview today patient is doing well denies any chest pain shortness of breath or any further neurological events.       She continues to take aspirin and Plavix    ROS:     Constitutional: not feeling tired, not feeling poorly, no fever and no chills.   Eyes: no eyesight problems, no blurred vision, no diplopia, no eye pain, no purulent discharge from the eyes, eyes not red, no dryness of the eyes and no itching of the eyes.   ENT: no nosebleeds, no hearing loss, no tinnitus, no earache, no sore throat, no hoarseness, no swollen glands in the neck and no nasal discharge.   Cardiovascular: no intermittent leg claudication, no chest pain, no tightness or heavy pressure, no shortness of breath, no palpitations, no lower extremity edema, the heart rate was not slow, the heart rate was not fast and as noted in HPI.   Respiratory: no chronic  cough, not coughing up sputum,  no wheezing that is consistent with asthma, no asthma, no orthopnea and no postural nocturnal dyspnea.   Gastrointestinal: no change in bowel habits, no blood in stools, no diarrhea, no constipation, no nausea, no vomiting, no abdominal pain, no signs and symptoms of ulcer disease, no jordan colored stools and no intolerance to fatty foods.   Genitourinary: no hematuria,  no urinary frequency, no dysuria, no incontinence, no burning sensation during urination, no urinary hesitancy, no nocturia, no genital lesion, no testicular pain, urinary stream is not smaller and urinary stream does not start and stop.   Musculoskeletal: no arthralgias, no myalgias, no joint swelling, no joint stiffness, no muscle weakness, no back pain, no limb pain, no limb swelling and no difficulty walking.   Skin: no skin rashes, no change in skin color and pigmentation, no skin lesions and no skin lumps.   Neurological: no seizures, no frequent falls, no headaches, no dizziness, no tingling, no fainting and no limb weakness.   Psychiatric: no depression, not suicidal, no confusion, no memory lapses or loss, no anxiety, no personality change and no emotional problems.   Endocrine: no goiter, no thyroid disorder, no diabetes mellitus, no excessive thirst, no dry skin, no cold intolerance, no heat intolerance, no erectile dysfunction, no increased urinary frequency, no proptosis and no deepening of the voice.   Hematologic/Lymphatic: no bleeding issues.   All other systems have been reviewed and are negative for complaint.     Physical Exam:    Visit Vitals  /67 (BP Location: Left arm, Patient Position: Sitting)   Pulse 54   Ht 1.524 m (5')   Wt (!) 43.1 kg (95 lb)   SpO2 99%   BMI 18.55 kg/m²   Smoking Status Never Assessed   BSA 1.35 m²        Constitutional: alert and in no acute distress.   Eyes: no erythema, swelling or discharge from the eye .   Ears, Nose, Mouth, and Throat: external inspection of  "ears and nose is normal , lips, teeth, and gums are normal with good dentition  and oropharynx normal with no erythema, edema, exudate or lesions .   Neck: neck is supple, symmetric, trachea midline, no masses  and no thyromegaly .   Pulmonary: no increased work of breathing or signs of respiratory distress , lungs clear to auscultation. , normal percussion of chest  and chest palpation normal .   Cardiovascular: RRR, no murmur,  no leg edema, non-displaced PMI, no S3 or S4  Abdomen: abdomen non-tender, no masses  and no hepatomegaly .           Skin:  no skin lesions          Neurologic: non-focal neurologic examination.      Psychiatric judgment and insight is normal , oriented to person, place and time , normal mood and affect .       Labs:  Results for orders placed or performed during the hospital encounter of 12/28/23   Transthoracic Echo (TTE) Limited   Result Value Ref Range    LV biplane EF 70     Tricuspid annular plane systolic excursion 2.2     LVIDd 4.20     RVSP 20.1     LV A4C EF 69.0           Meds:    Current Outpatient Medications   Medication Instructions    aspirin 81 mg, oral, Daily    atorvastatin (LIPITOR) 80 mg, oral, Daily    clopidogrel (PLAVIX) 75 mg, oral, Daily    estradiol (VAGIFEM) 10 mcg, vaginal, 2 times weekly, On Monday and Thursday    levothyroxine (SYNTHROID) 75 mcg, oral, Daily before breakfast        Reviewed Echo images from study today and incorporated these findings into decision making.    Assessment:    This is a 63-year-old female with history of TIA and stroke, who is now status post PFO closure with 25 mm Amplatz PFO occluder device.  Patient is doing well with no further neurological events.       -We reviewed the images of echocardiogram done today which continues to demonstrate a well-seated PFO occluder device with no leak around the device and a negative \"bubble\" study     -Patient will continue aspirin and Plavix for 6 months postprocedure, after which she will " stop the Plavix and continue to take aspirin 81 mg daily     - We will repeat an echocardiogram at 12 months postprocedure and follow-up at that time

## 2024-06-10 DIAGNOSIS — N95.2 VAGINAL ATROPHY: ICD-10-CM

## 2024-06-11 RX ORDER — ESTRADIOL 10 UG/1
10 INSERT VAGINAL 2 TIMES WEEKLY
Qty: 18 TABLET | Refills: 3 | Status: SHIPPED | OUTPATIENT
Start: 2024-06-13

## 2024-08-27 ENCOUNTER — APPOINTMENT (OUTPATIENT)
Dept: OBSTETRICS AND GYNECOLOGY | Facility: CLINIC | Age: 64
End: 2024-08-27
Payer: COMMERCIAL

## 2024-09-26 DIAGNOSIS — Q21.12 PFO (PATENT FORAMEN OVALE) (HHS-HCC): ICD-10-CM

## 2024-10-01 ENCOUNTER — APPOINTMENT (OUTPATIENT)
Dept: OBSTETRICS AND GYNECOLOGY | Facility: CLINIC | Age: 64
End: 2024-10-01
Payer: COMMERCIAL

## 2024-10-02 NOTE — PROGRESS NOTES
64 y/o G0 w/ 2 adopted children    Had PFO corrected last year without complications    Last gyn: 7/25/2023 with me  Pap: 7/25/2023 unremarkable w/ co-testing  Mammogram: 8/20/2021 unremarkable  Colonoscopy: 2020   -Mom with colon cancer  Contraception: n/a    CONSTITUTIONAL: Alert and in no acute distress. Well developed, well nourished.   HEAD AND FACE: Head and face: Normal.   EYES: Normal external exam - nonicteric sclera, extraocular movements intact (EOMI) and no ptosis.   EARS, NOSE, MOUTH, AND THROAT: External inspection of ears and nose: Normal.   BREASTS: No masses tenderness nipple discharge or axillary nodes  ABDOMEN: Soft, nontender  GENITOURINARY: External genitalia: Normal. No inguinal lymphadenopathy. Bartholin's Urethral and Skenes Glands: Normal. Urethra: Normal. Bladder: Normal on palpation. Vagina: Normal. Cervix: Normal. Uterus: Normal. Right Adnexa/parametria: Normal. Left Adnexa/parametria: Normal. Inspection of Perianal Area: Normal.   MUSCULOSKELETAL: No joint swelling seen, normal movements of all extremities.   SKIN: Normal skin color and pigmentation, normal skin turgor, and no rash.   NEUROLOGIC: Non-focal. Grossly intact.        PSYCHIATRIC: Alert and oriented x 3. Affect normal to patient baseline. Mood: Appropriate.     Assessment/plan:

## 2024-10-08 ENCOUNTER — APPOINTMENT (OUTPATIENT)
Dept: OBSTETRICS AND GYNECOLOGY | Facility: CLINIC | Age: 64
End: 2024-10-08
Payer: COMMERCIAL

## 2024-10-08 VITALS
DIASTOLIC BLOOD PRESSURE: 50 MMHG | WEIGHT: 100 LBS | SYSTOLIC BLOOD PRESSURE: 94 MMHG | HEIGHT: 60 IN | BODY MASS INDEX: 19.63 KG/M2

## 2024-10-08 DIAGNOSIS — Z78.0 MENOPAUSE: ICD-10-CM

## 2024-10-08 DIAGNOSIS — Z01.419 WELL WOMAN EXAM WITH ROUTINE GYNECOLOGICAL EXAM: Primary | ICD-10-CM

## 2024-10-08 DIAGNOSIS — Z12.31 BREAST CANCER SCREENING BY MAMMOGRAM: ICD-10-CM

## 2024-10-08 PROCEDURE — 3008F BODY MASS INDEX DOCD: CPT | Performed by: OBSTETRICS & GYNECOLOGY

## 2024-10-08 PROCEDURE — 1036F TOBACCO NON-USER: CPT | Performed by: OBSTETRICS & GYNECOLOGY

## 2024-10-08 PROCEDURE — 99396 PREV VISIT EST AGE 40-64: CPT | Performed by: OBSTETRICS & GYNECOLOGY

## 2024-10-08 ASSESSMENT — PAIN SCALES - GENERAL: PAINLEVEL: 0-NO PAIN

## 2024-11-04 ENCOUNTER — APPOINTMENT (OUTPATIENT)
Dept: DERMATOLOGY | Facility: CLINIC | Age: 64
End: 2024-11-04
Payer: COMMERCIAL

## 2024-11-04 DIAGNOSIS — L82.1 SEBORRHEIC KERATOSIS: Primary | ICD-10-CM

## 2024-11-04 DIAGNOSIS — L81.8 IDIOPATHIC GUTTATE HYPOMELANOSIS: ICD-10-CM

## 2024-11-04 PROCEDURE — 99213 OFFICE O/P EST LOW 20 MIN: CPT | Performed by: NURSE PRACTITIONER

## 2024-11-04 PROCEDURE — 1036F TOBACCO NON-USER: CPT | Performed by: NURSE PRACTITIONER

## 2024-11-04 NOTE — PROGRESS NOTES
Subjective     Heena Castelan is a 64 y.o. female who presents for the following: multiple lesions.   Established patient in for lesion to face, left upper chest and right wrist for a months. Lesion to right wrist described as burning sensation.     Review of Systems:  No other skin or systemic complaints other than what is documented elsewhere in the note.    The following portions of the chart were reviewed this encounter and updated as appropriate:       Skin Cancer History  Hx of BCC 2015- right canthus. Right collarbone     Specialty Problems    None    Past Medical History:  Heena Castelan  has a past medical history of Disease of thyroid gland, Other conditions influencing health status, Personal history of colonic polyps (04/22/2020), Personal history of other diseases of the circulatory system, Personal history of other diseases of the female genital tract, Personal history of other endocrine, nutritional and metabolic disease, Personal history of transient ischemic attack (TIA), and cerebral infarction without residual deficits, and Stroke (Multi).    Past Surgical History:  Heena Castelan  has a past surgical history that includes Other surgical history (06/10/2022); Other surgical history (06/10/2022); Other surgical history (06/10/2022); and Cardiac catheterization (N/A, 10/17/2023).    Family History:  Patient family history is not on file.    Social History:  Heena Castelan  reports that she has never smoked. She has never used smokeless tobacco. She reports current alcohol use. She reports that she does not use drugs.    Allergies:  Amoxicillin-pot clavulanate    Current Medications / CAM's:    Current Outpatient Medications:     aspirin 81 mg chewable tablet, Chew 1 tablet (81 mg) once daily., Disp: 90 tablet, Rfl: 11    estradiol (Vagifem) 10 mcg tablet vaginal tablet, Insert 1 tablet (10 mcg) into the vagina 2 times a week. Insert vaginally 2 times a week, Disp: 18 tablet, Rfl: 3    levothyroxine  (Synthroid) 75 mcg tablet, Take 1 tablet (75 mcg) by mouth once daily in the morning. Take before meals., Disp: , Rfl:      Objective   Well appearing patient in no apparent distress; mood and affect are within normal limits.      Assessment/Plan   1. Seborrheic keratosis  Left Buccal Cheek  Stuck on, waxy macule(s)/papule(s)/plaque(s) with comedo-like openings and milia like cysts    -Discussed nature of condition  -Reassurance, recommend continued observation    2. Idiopathic guttate hypomelanosis (3)  Left Elbow - Posterior, Left Forearm - Posterior, Right Forearm - Posterior  -Discussed the nature of the diagnosis  -Reassurance, recommend continued observation    Small hypopigmented macules in sun exposed areas

## 2024-11-20 ENCOUNTER — HOSPITAL ENCOUNTER (OUTPATIENT)
Dept: RADIOLOGY | Facility: CLINIC | Age: 64
Discharge: HOME | End: 2024-11-20
Payer: COMMERCIAL

## 2024-11-20 VITALS — BODY MASS INDEX: 19.63 KG/M2 | WEIGHT: 100 LBS | HEIGHT: 60 IN

## 2024-11-20 DIAGNOSIS — Z12.31 BREAST CANCER SCREENING BY MAMMOGRAM: ICD-10-CM

## 2024-11-20 PROCEDURE — 77067 SCR MAMMO BI INCL CAD: CPT

## 2024-11-27 ENCOUNTER — HOSPITAL ENCOUNTER (OUTPATIENT)
Dept: CARDIOLOGY | Facility: CLINIC | Age: 64
Discharge: HOME | End: 2024-11-27
Payer: COMMERCIAL

## 2024-11-27 ENCOUNTER — OFFICE VISIT (OUTPATIENT)
Dept: CARDIOLOGY | Facility: CLINIC | Age: 64
End: 2024-11-27
Payer: COMMERCIAL

## 2024-11-27 VITALS
OXYGEN SATURATION: 98 % | BODY MASS INDEX: 19.63 KG/M2 | DIASTOLIC BLOOD PRESSURE: 55 MMHG | SYSTOLIC BLOOD PRESSURE: 106 MMHG | RESPIRATION RATE: 16 BRPM | TEMPERATURE: 96.9 F | WEIGHT: 100 LBS | HEART RATE: 64 BPM | HEIGHT: 60 IN

## 2024-11-27 DIAGNOSIS — Z87.74 S/P PATENT FORAMEN OVALE CLOSURE: Primary | ICD-10-CM

## 2024-11-27 DIAGNOSIS — Q21.12 PFO (PATENT FORAMEN OVALE) (HHS-HCC): ICD-10-CM

## 2024-11-27 PROCEDURE — 99214 OFFICE O/P EST MOD 30 MIN: CPT | Performed by: INTERNAL MEDICINE

## 2024-11-27 PROCEDURE — 93308 TTE F-UP OR LMTD: CPT | Performed by: INTERNAL MEDICINE

## 2024-11-27 PROCEDURE — 93308 TTE F-UP OR LMTD: CPT

## 2024-11-27 PROCEDURE — 3008F BODY MASS INDEX DOCD: CPT | Performed by: INTERNAL MEDICINE

## 2024-11-28 LAB
AORTIC VALVE MEAN GRADIENT: 5 MMHG
AORTIC VALVE PEAK VELOCITY: 1.5 M/S
AV PEAK GRADIENT: 9 MMHG
AVA (PEAK VEL): 1.61 CM2
AVA (VTI): 1.46 CM2
EJECTION FRACTION APICAL 4 CHAMBER: 60.4
EJECTION FRACTION: 60 %
LEFT VENTRICLE INTERNAL DIMENSION DIASTOLE: 4.1 CM (ref 3.5–6)
LEFT VENTRICULAR OUTFLOW TRACT DIAMETER: 1.6 CM
LV EJECTION FRACTION BIPLANE: 60 %
MITRAL VALVE E/A RATIO: 1.41
MITRAL VALVE E/E' RATIO: 6.2
RIGHT VENTRICLE PEAK SYSTOLIC PRESSURE: 20 MMHG

## 2025-01-31 DIAGNOSIS — N95.2 VAGINAL ATROPHY: ICD-10-CM

## 2025-01-31 NOTE — PROGRESS NOTES
Fax from mail order pharmacy was faxed to the clinic.  Pt needs appt 7.2025 for med follow up/annual. Script sent to MD for approval

## 2025-02-01 RX ORDER — ESTRADIOL 10 UG/1
10 INSERT VAGINAL 2 TIMES WEEKLY
Qty: 18 TABLET | Refills: 2 | Status: SHIPPED | OUTPATIENT
Start: 2025-02-03

## 2025-02-11 ENCOUNTER — TELEPHONE (OUTPATIENT)
Dept: OBSTETRICS AND GYNECOLOGY | Facility: CLINIC | Age: 65
End: 2025-02-11
Payer: COMMERCIAL

## 2025-02-11 DIAGNOSIS — N95.2 VAGINAL ATROPHY: ICD-10-CM

## 2025-02-11 RX ORDER — ESTRADIOL 10 UG/1
10 INSERT VAGINAL 2 TIMES WEEKLY
Qty: 24 TABLET | Refills: 3 | Status: SHIPPED | OUTPATIENT
Start: 2025-02-13

## 2025-02-11 NOTE — TELEPHONE ENCOUNTER
Message received from pt.  Addiem order was reordered in system and sent to her mail order as requested.

## 2025-04-30 ENCOUNTER — HOSPITAL ENCOUNTER (OUTPATIENT)
Dept: RADIOLOGY | Facility: CLINIC | Age: 65
Discharge: HOME | End: 2025-04-30
Payer: COMMERCIAL

## 2025-04-30 DIAGNOSIS — Z78.0 MENOPAUSE: ICD-10-CM

## 2025-04-30 PROCEDURE — 77080 DXA BONE DENSITY AXIAL: CPT

## (undated) DEVICE — GUIDEWIRE, INQWIRE, 3MM J, .035, 150

## (undated) DEVICE — Device

## (undated) DEVICE — DELIVERY SYSTEM, TREVUSIO, 9FR, 80CM, 45 DEG

## (undated) DEVICE — GUIDEWIRE, AMPLATZER, 0.035 X 1.5MM X 260CM, SUPER STIFF

## (undated) DEVICE — CLOSURE SYSTEM, VASCULAR, MVP 6-12FR, VENOUS

## (undated) DEVICE — SHEATH, INTRODUCER, 10CM, 8FR, R/O RADIOPAQUE MARKER, 8FR DIA, 2.5 CM DILATOR.

## (undated) DEVICE — GUIDEWIRE, HI-TORQUE, VERSACORE, 145CM, FLOPPY

## (undated) DEVICE — ACCESS KIT, S-MAK MINI, 4FR 10CM 0.018IN 40CM, NT/PT, ECHO ENHANCE NEEDLE

## (undated) DEVICE — CATHETER, ACUSON ACUNAV ULTRASOUND, 8FR 90CM

## (undated) DEVICE — CATHETER, ANGIO, IMPULSE, MPA1, 6 FR X 100 CM

## (undated) DEVICE — SHEATH, PINNACLE, W/O GUIDEWIRE, 8FR INTRODUCER,  8FR DIA, 2.5 CM DILATOR